# Patient Record
Sex: MALE | Race: OTHER | NOT HISPANIC OR LATINO | URBAN - METROPOLITAN AREA
[De-identification: names, ages, dates, MRNs, and addresses within clinical notes are randomized per-mention and may not be internally consistent; named-entity substitution may affect disease eponyms.]

---

## 2023-10-28 ENCOUNTER — EMERGENCY (EMERGENCY)
Facility: HOSPITAL | Age: 28
LOS: 1 days | Discharge: HOME | End: 2023-10-28
Admitting: EMERGENCY MEDICINE
Payer: MEDICAID

## 2023-10-28 VITALS
RESPIRATION RATE: 16 BRPM | TEMPERATURE: 99 F | HEART RATE: 80 BPM | DIASTOLIC BLOOD PRESSURE: 75 MMHG | WEIGHT: 179.9 LBS | OXYGEN SATURATION: 96 % | SYSTOLIC BLOOD PRESSURE: 126 MMHG | HEIGHT: 73 IN

## 2023-10-28 DIAGNOSIS — S93.114A DISLOCATION OF INTERPHALANGEAL JOINT OF RIGHT LESSER TOE(S), INITIAL ENCOUNTER: ICD-10-CM
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
## 2023-10-28 DIAGNOSIS — S91.114A LACERATION WITHOUT FOREIGN BODY OF RIGHT LESSER TOE(S) WITHOUT DAMAGE TO NAIL, INITIAL ENCOUNTER: ICD-10-CM

## 2023-10-28 DIAGNOSIS — Z23 ENCOUNTER FOR IMMUNIZATION: ICD-10-CM
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
## 2023-10-28 DIAGNOSIS — M79.674 PAIN IN RIGHT TOE(S): ICD-10-CM

## 2023-10-28 PROCEDURE — 12001 RPR S/N/AX/GEN/TRNK 2.5CM/<: CPT

## 2023-10-28 PROCEDURE — 99284 EMERGENCY DEPT VISIT MOD MDM: CPT | Mod: 25

## 2023-10-28 PROCEDURE — 73630 X-RAY EXAM OF FOOT: CPT | Mod: 26,RT,76

## 2023-10-28 RX ORDER — IBUPROFEN 200 MG
600 TABLET ORAL ONCE
Refills: 0 | Status: COMPLETED | OUTPATIENT
Start: 2023-10-28 | End: 2023-10-28

## 2023-10-28 RX ORDER — TETANUS TOXOID, REDUCED DIPHTHERIA TOXOID AND ACELLULAR PERTUSSIS VACCINE, ADSORBED 5; 2.5; 8; 8; 2.5 [IU]/.5ML; [IU]/.5ML; UG/.5ML; UG/.5ML; UG/.5ML
0.5 SUSPENSION INTRAMUSCULAR ONCE
Refills: 0 | Status: COMPLETED | OUTPATIENT
Start: 2023-10-28 | End: 2023-10-28

## 2023-10-28 RX ADMIN — Medication 600 MILLIGRAM(S): at 01:53

## 2023-10-28 RX ADMIN — TETANUS TOXOID, REDUCED DIPHTHERIA TOXOID AND ACELLULAR PERTUSSIS VACCINE, ADSORBED 0.5 MILLILITER(S): 5; 2.5; 8; 8; 2.5 SUSPENSION INTRAMUSCULAR at 00:32

## 2023-10-28 NOTE — ED PROVIDER NOTE - CLINICAL SUMMARY MEDICAL DECISION MAKING FREE TEXT BOX
No swelling, erythema, warmth to joint. ROM intact, VSS - septic arthritis unlikely.  Pt does not meet Wells criteria - DVT unlikely  Does not meet Karavel’s signs for Flexor Tenosynovitis  No paresthesia, pallor, paralysis, pulselenness - compartment syndrome unlikely  Xray ordered to rule out/in bony deformities.  Pain control medications ordered

## 2023-10-28 NOTE — ED ADULT NURSE NOTE - OBJECTIVE STATEMENT
29 y/o M here with possible dislocation of R. 3rd and 4th toe after kicking something while playing soccer. NKA. A&Ox4. Ambulatory

## 2023-10-28 NOTE — ED PROCEDURE NOTE - CPROC ED TIME OUT STATEMENT1
“Patient's name, , procedure and correct site were confirmed during the [place] Timeout.”
“Patient's name, , procedure and correct site were confirmed during the [place] Timeout.”

## 2023-10-28 NOTE — ED ADULT TRIAGE NOTE - CHIEF COMPLAINT QUOTE
Pt. walk in from urgent for dislocation of R. 3rd and 4th toe after kicking something while playing soccer.

## 2023-10-28 NOTE — ED PROVIDER NOTE - OBJECTIVE STATEMENT
29 yo M, no pmh, presents to this ED for 3rd and 4th L toe pain and possible dislocation. Pt states that he was playing soccer, and he kicked someone's shin, and since has been in significant pain. Pt states that he went to urgent afterwards, was given crutched and a wrap was placed around a wound and instructed to come to the ED.  Denies SOB, CP, calf tenderness/swelling, hemoptysis, recent surgeries, hx of CA, long plane/train/car rides, past clots in legs/lungs. Has been ambulating.

## 2023-10-28 NOTE — ED PROVIDER NOTE - PHYSICAL EXAMINATION
General: well developed, well nourished, no distress  Eye: bilateral: PERRL, EOMI  Ears, Nose, Throat: normal pharynx, TMs normal, membranes moist.  Neck: non-tender, full range of motion, supple.  Negative For: lymphadenopathy (R), lymphadenopathy (L)  Respiratory: CTAB.  Cardiovascular: S1-S2 normal, regular rate, regular rhythm.  Abdomen: normal bowel sounds, non tender, soft.    Genitourinary: Negative For: CVA tenderness  Musculoskeletal: normal gait.  Negative For: back pain, upper, back pain  Extremities: normal range of motion, non-tender.  Negative For: edema (R), edema (L), calf tenderness (R), calf tenderness (L), swelling  Extremity Strength: upper extremities equal bilateral: 5/5, lower extremities equal bilateral: 5/5  Neurologic: alert, oriented to person, oriented to place, oriented to time.    Skin: normal color.  Negative For: rash  0.3 cm linear laceration on the dorsal aspect of the 3rd digit  Psychiatric: normal affect, normal insight, normal concentration

## 2023-10-28 NOTE — ED PROVIDER NOTE - NSFOLLOWUPINSTRUCTIONS_ED_ALL_ED_FT
Your Laceration Aftercare Instructions    A laceration, or cut, is an open wound through the skin.  These can be due to many different causes and can come in many different forms.  The depth, location, and cause of your wound, among multiple other factors (including your preference) effect the treatment choices we made today.    At the very least today, we’ve cleaned, assessed and dressed your wound.    Additional care you need depends on the type of cut or wound you have. I may have used stitches, staples, tape (like “steri-strips”), or skin glue (like “dermabond”) to close the wound. The main purposes of these treatments are to stop the bleeding, and to help the wound heal with reduced scarring.    How can you care for yourself at home?    Keep the wound clean and dry for the first 12-24 hours.  The wound has probably had a bandage applied (unless skin glue was used, or if the wound was in a hard-to-bandage area, like your hair).  You can remove the bandage after 12-24 hours at your convenience.  After this, no further dressings are necessary, unless I advised you differently today.    Dressing a Laceration  Example of dressing a laceration after repair. Image courtesy of [employer], under Creative Commons [employer] (http://creativecommons.org/licenses/by-nc-sa/3.0/).  If your wound was sutured or stapled:    You can clean the area with a mild soap and water 2 times a day. Don’t use hydrogen peroxide, iodine-based solutions, or alcohol, which can slow healing, and will probably be painful!    You may cover the wound with a thin layer of antibiotic ointment, such as bacitracin or neosporin.  Be advised that some people can develop allergic skin rashes to topical antibiotics, especially neomycin (a component of neosporin) so you should avoid this if you have a known sensitivity.  You can continue to apply the antibiotic twice daily until the wound scars and dries out, or the sutures/staples are removed.    Sutures and staples should be removed within 3 days to 2 weeks, depending on where on your body they are located.  One exception is if absorbable sutures were used–these sometimes don’t require a visit for removal–I’ll advise you if this was the case.  The general rules of thumb for suture removal are as follows:    Optimal time frame for suture removal: 7-10 days    Keep in mind, specific situations related to your wound as well as your other medical conditions play in to the optimal removal time–this is just a guideline!

## 2023-10-28 NOTE — ED PROVIDER NOTE - PROGRESS NOTE DETAILS
Xray shows 3rd and 4th digit dislocations  Reduction attempted, post-reduction xrays ordered Patient sitting comfortably room, no acute distress  Postreduction x-rays show successful reduction  Patient's wound examined again, and required 3 sutures  Patient stable for discharge.  Augmentin sent to pharmacy  All results with patient.  Patient understands and agrees with plan.  Agreed to follow-up with primary care doctor in 2 to 3 days.

## 2023-10-28 NOTE — ED PROVIDER NOTE - PATIENT PORTAL LINK FT
You can access the FollowMyHealth Patient Portal offered by [facility] by registering at the following website: [email or web address]. By joining [employer]’s FollowMyHealth portal, you will also be able to view your health information using other applications (apps) compatible with our system.
